# Patient Record
Sex: MALE | Race: WHITE
[De-identification: names, ages, dates, MRNs, and addresses within clinical notes are randomized per-mention and may not be internally consistent; named-entity substitution may affect disease eponyms.]

---

## 2018-01-02 ENCOUNTER — MEDICATION RENEWAL (OUTPATIENT)
Age: 83
End: 2018-01-02

## 2018-01-02 PROBLEM — Z00.00 ENCOUNTER FOR PREVENTIVE HEALTH EXAMINATION: Status: ACTIVE | Noted: 2018-01-02

## 2018-04-30 ENCOUNTER — APPOINTMENT (OUTPATIENT)
Dept: ENDOCRINOLOGY | Facility: CLINIC | Age: 83
End: 2018-04-30
Payer: MEDICARE

## 2018-04-30 VITALS
BODY MASS INDEX: 25.09 KG/M2 | HEART RATE: 61 BPM | SYSTOLIC BLOOD PRESSURE: 135 MMHG | WEIGHT: 158 LBS | TEMPERATURE: 97.1 F | HEIGHT: 66.5 IN | DIASTOLIC BLOOD PRESSURE: 74 MMHG | OXYGEN SATURATION: 96 %

## 2018-04-30 DIAGNOSIS — Z80.6 FAMILY HISTORY OF LEUKEMIA: ICD-10-CM

## 2018-04-30 DIAGNOSIS — Z80.0 FAMILY HISTORY OF MALIGNANT NEOPLASM OF DIGESTIVE ORGANS: ICD-10-CM

## 2018-04-30 DIAGNOSIS — I21.9 ACUTE MYOCARDIAL INFARCTION, UNSPECIFIED: ICD-10-CM

## 2018-04-30 DIAGNOSIS — N40.0 BENIGN PROSTATIC HYPERPLASIA WITHOUT LOWER URINARY TRACT SYMPMS: ICD-10-CM

## 2018-04-30 DIAGNOSIS — Z87.891 PERSONAL HISTORY OF NICOTINE DEPENDENCE: ICD-10-CM

## 2018-04-30 DIAGNOSIS — C91.90 LYMPHOID LEUKEMIA, UNSPECIFIED NOT HAVING ACHIEVED REMISSION: ICD-10-CM

## 2018-04-30 DIAGNOSIS — E55.9 VITAMIN D DEFICIENCY, UNSPECIFIED: ICD-10-CM

## 2018-04-30 DIAGNOSIS — I25.10 ATHEROSCLEROTIC HEART DISEASE OF NATIVE CORONARY ARTERY W/OUT ANGINA PECTORIS: ICD-10-CM

## 2018-04-30 PROCEDURE — 99214 OFFICE O/P EST MOD 30 MIN: CPT

## 2018-04-30 RX ORDER — DUTASTERIDE 0.5 MG/1
0.5 CAPSULE, LIQUID FILLED ORAL
Refills: 0 | Status: ACTIVE | COMMUNITY
Start: 2018-04-30

## 2018-04-30 RX ORDER — TAMSULOSIN HYDROCHLORIDE 0.4 MG/1
0.4 CAPSULE ORAL
Refills: 0 | Status: ACTIVE | COMMUNITY
Start: 2018-04-30

## 2018-04-30 RX ORDER — CHROMIUM 200 MCG
1000 TABLET ORAL DAILY
Refills: 0 | Status: ACTIVE | COMMUNITY
Start: 2018-04-30

## 2018-05-03 ENCOUNTER — RX RENEWAL (OUTPATIENT)
Age: 83
End: 2018-05-03

## 2018-05-03 RX ORDER — LEVOTHYROXINE SODIUM 0.09 MG/1
88 TABLET ORAL
Qty: 30 | Refills: 11 | Status: DISCONTINUED | COMMUNITY
Start: 2018-01-02 | End: 2018-05-03

## 2018-12-03 ENCOUNTER — APPOINTMENT (OUTPATIENT)
Dept: ENDOCRINOLOGY | Facility: CLINIC | Age: 83
End: 2018-12-03

## 2019-01-08 ENCOUNTER — TRANSCRIPTION ENCOUNTER (OUTPATIENT)
Age: 84
End: 2019-01-08

## 2019-01-30 ENCOUNTER — APPOINTMENT (OUTPATIENT)
Dept: ENDOCRINOLOGY | Facility: CLINIC | Age: 84
End: 2019-01-30

## 2019-02-15 ENCOUNTER — MEDICATION RENEWAL (OUTPATIENT)
Age: 84
End: 2019-02-15

## 2019-04-16 ENCOUNTER — MEDICATION RENEWAL (OUTPATIENT)
Age: 84
End: 2019-04-16

## 2019-05-30 ENCOUNTER — RX RENEWAL (OUTPATIENT)
Age: 84
End: 2019-05-30

## 2019-06-26 ENCOUNTER — MEDICATION RENEWAL (OUTPATIENT)
Age: 84
End: 2019-06-26

## 2019-07-17 ENCOUNTER — APPOINTMENT (OUTPATIENT)
Dept: ENDOCRINOLOGY | Facility: CLINIC | Age: 84
End: 2019-07-17
Payer: MEDICARE

## 2019-07-17 VITALS
BODY MASS INDEX: 24.78 KG/M2 | HEIGHT: 66.5 IN | DIASTOLIC BLOOD PRESSURE: 57 MMHG | HEART RATE: 49 BPM | WEIGHT: 156 LBS | OXYGEN SATURATION: 98 % | SYSTOLIC BLOOD PRESSURE: 155 MMHG

## 2019-07-17 DIAGNOSIS — R73.01 IMPAIRED FASTING GLUCOSE: ICD-10-CM

## 2019-07-17 DIAGNOSIS — M17.0 BILATERAL PRIMARY OSTEOARTHRITIS OF KNEE: ICD-10-CM

## 2019-07-17 DIAGNOSIS — H91.93 UNSPECIFIED HEARING LOSS, BILATERAL: ICD-10-CM

## 2019-07-17 PROCEDURE — 99214 OFFICE O/P EST MOD 30 MIN: CPT

## 2019-07-21 PROBLEM — M17.0 OSTEOARTHRITIS OF BOTH KNEES, UNSPECIFIED OSTEOARTHRITIS TYPE: Status: ACTIVE | Noted: 2019-07-21

## 2019-07-21 PROBLEM — H91.93 HEARING DEFICIT, BILATERAL: Status: ACTIVE | Noted: 2019-07-21

## 2019-07-21 PROBLEM — R73.01 IMPAIRED FASTING GLUCOSE: Status: ACTIVE | Noted: 2019-07-21

## 2019-07-21 RX ORDER — LEVOTHYROXINE SODIUM 0.1 MG/1
100 TABLET ORAL
Qty: 90 | Refills: 0 | Status: DISCONTINUED | COMMUNITY
Start: 2018-05-03 | End: 2019-07-21

## 2019-07-21 NOTE — DATA REVIEWED
[FreeTextEntry1] : [employer]  (7/3/19)\par \par ,  A1c 4.9%\par CMP -- decreased globulins (1.5 gm/dl), otherwise within normal limits \par LDL 55, HDL 18, \par TSH level 5.01 uU/ml  (0.4-4.5)\par 25-D level in target range at 34 ng/ml

## 2019-07-21 NOTE — PHYSICAL EXAM
[Alert] : alert [Well Nourished] : well nourished [Healthy Appearance] : healthy appearance [Normal Sclera/Conjunctiva] : normal sclera/conjunctiva [EOMI] : extra ocular movement intact [No Proptosis] : no proptosis [No Lid Lag] : no lid lag [Normal Outer Ear/Nose] : the ears and nose were normal in appearance [No Neck Mass] : no neck mass was observed [No LAD] : no lymphadenopathy [Thyroid Not Enlarged] : the thyroid was not enlarged [Clear to Auscultation] : lungs were clear to auscultation bilaterally [Normal Rate] : heart rate was normal  [Regular Rhythm] : with a regular rhythm [Carotids Normal] : carotid pulses were normal with no bruits [No Edema] : there was no peripheral edema [Not Tender] : non-tender [Soft] : abdomen soft [No HSM] : no hepato-splenomegaly [Normal] : normal and non tender [No CVA Tenderness] : no ~M costovertebral angle tenderness [Normal Strength/Tone] : muscle strength and tone were normal [No Involuntary Movements] : no involuntary movements were seen [No Rash] : no rash [No Tremors] : no tremors [Normal Affect] : the affect was normal [Normal Mood] : the mood was normal [Gynecomastia] : no gynecomastia [Kyphosis] : no kyphosis present [Foot Ulcers] : no foot ulcers [Acanthosis Nigricans] : no acanthosis nigricans [de-identified] : Appears somewhat younger than his stated age [de-identified] : Significant hearing deficit even with both hearing aids in place [de-identified] : Pupils miotic.  No corneal arcus or xanthelasma [de-identified] : No murmurs [de-identified] : Multiple large varicosities both lower legs.  DP pulses non-palpable bilaterally but capillary refill is normal [de-identified] : No cervical or supraclavicular adenopathy [de-identified] : Valgus deformities of both ankles/feet.  Moderate swelling of both knees [de-identified] : Hip flexor strength 4/5 bilaterally, quadriceps 4+/5 bilaterally [de-identified] : Toenails mycotic.  Significant male-pattern hair loss

## 2019-07-21 NOTE — REASON FOR VISIT
[Follow-Up: _____] : a [unfilled] follow-up visit [FreeTextEntry1] : hypothyroidism, hyperlipidemia and impaired fasting glucose

## 2019-07-21 NOTE — ASSESSMENT
[FreeTextEntry1] : 1) Hypothyroidism:  TSH level is mildly elevated on the current bloodwork.  Pt appears to be compliant with his regimen, so would assume that we are seeing a further loss of endogenous thyroid reserve.\par --To increase his levothyroxine to 112 mcg/day at his next refill\par \par 2) Hyperlipidemia:  LDL-cholesterol is well below his target of 70 mg% despite the rather minimal dose of atorvastatin.\par --Continue atorvastatin 5 mg/day\par \par 3) Impaired fasting glucose:  FBS is barely into the IFG range, and his A1c level is well below the threshold for pre-diabetes.\par \par 4) Hypertension:  Pt has no previous history of hypertension, but his BP is distinctly elevated on today's exam.\par --Pt is to begin monitoring BPs at home.  Target of < 130-140/80-85 was discussed [FreeTextEntry2] : Home BP monitoring and targets

## 2019-07-21 NOTE — HISTORY OF PRESENT ILLNESS
[FreeTextEntry1] : 89-year-old man who is followed for hypothyroidism, hyperlipidemia and impaired fasting glucose.  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  His other active medical problems include CLL (stable for the past several years), BPH (s/p TURP in 2013 after he presented with gross hematuria) and osteoarthritis of both knees.\par \par He now returns after a hiatus of over a year.\par Interim history since his last visit:\par --Has had increasing difficulty with ambulation secondary to the OA in his knees.  He is technically a candidate for joint replacement, but his orthopedist is unwilling to do the surgery because of his age.  He uses a cane when he is out of the house.\par --Has continued seeing the hematologist at [facility], and his CBC has been stable.  \par --Has been more fatigued, despite getting 9-10 hours of sleep a night\par --Has had worsening vision in his R eye and has seen two different ophthalmologists, but cannot tell me what the problem is.\par Taking the levothyroxine consistently and on an empty stomach.  (Usually takes it at 5 AM when he is awakened to urinate).  Has no thyroid-related symptoms (other than the fatigue).

## 2019-07-21 NOTE — REVIEW OF SYSTEMS
[Fatigue] : fatigue [Hair Loss] : hair loss [Decreased Appetite] : appetite not decreased [Recent Weight Gain (___ Lbs)] : no recent weight gain [Recent Weight Loss (___ Lbs)] : no recent weight loss [Fever] : no fever [Dry Eyes] : no dryness of the eyes [Chills] : no chills [Eyes Itch] : no itching of the eyes [Dysphagia] : no dysphagia [Chest Pain] : no chest pain [Palpitations] : no palpitations [Lower Ext Edema] : no lower extremity edema [Shortness Of Breath] : no shortness of breath [Cough] : no cough [Wheezing] : no wheezing was heard [Nausea] : no nausea [SOB on Exertion] : no shortness of breath during exertion [Diarrhea] : no diarrhea [Constipation] : no constipation [Polyuria] : no polyuria [Heartburn] : no heartburn [Dysuria] : no dysuria [Muscle Cramps] : no muscle cramps [Dry Skin] : no dry skin [Myalgia] : no myalgia  [Tremors] : no tremors [Headache] : no headaches [Pain/Numbness of Digits] : no pain/numbness of digits [Depression] : no depression [Anxiety] : no anxiety [Stress] : no stress [Insomnia] : no insomnia [Polydipsia] : no polydipsia [Cold Intolerance] : cold tolerant [Easy Bleeding] : no ~M tendency for easy bleeding [Heat Intolerance] : heat tolerant [FreeTextEntry3] : See comments in the HPI re: decreased acuity in the R eye [Easy Bruising] : no tendency for easy bruising [FreeTextEntry9] : Pain and mild swelling in both knees [FreeTextEntry4] : Significant hearing deficit is stable [FreeTextEntry8] : Nocturia once a night, but has increased frequency during the day [de-identified] : Ambulation is impaired by his knee pain and foot deformities

## 2019-09-19 ENCOUNTER — RX RENEWAL (OUTPATIENT)
Age: 84
End: 2019-09-19

## 2020-01-21 ENCOUNTER — APPOINTMENT (OUTPATIENT)
Dept: ENDOCRINOLOGY | Facility: CLINIC | Age: 85
End: 2020-01-21

## 2020-03-31 ENCOUNTER — RX RENEWAL (OUTPATIENT)
Age: 85
End: 2020-03-31

## 2020-05-13 ENCOUNTER — APPOINTMENT (OUTPATIENT)
Dept: ENDOCRINOLOGY | Facility: CLINIC | Age: 85
End: 2020-05-13

## 2020-08-20 ENCOUNTER — APPOINTMENT (OUTPATIENT)
Dept: ENDOCRINOLOGY | Facility: CLINIC | Age: 85
End: 2020-08-20

## 2020-11-13 ENCOUNTER — APPOINTMENT (OUTPATIENT)
Dept: ENDOCRINOLOGY | Facility: CLINIC | Age: 85
End: 2020-11-13

## 2022-06-30 ENCOUNTER — APPOINTMENT (OUTPATIENT)
Dept: ENDOCRINOLOGY | Facility: CLINIC | Age: 87
End: 2022-06-30

## 2022-06-30 VITALS
DIASTOLIC BLOOD PRESSURE: 65 MMHG | HEART RATE: 60 BPM | TEMPERATURE: 97.5 F | WEIGHT: 137 LBS | BODY MASS INDEX: 21.76 KG/M2 | OXYGEN SATURATION: 95 % | SYSTOLIC BLOOD PRESSURE: 128 MMHG | HEIGHT: 66.5 IN

## 2022-06-30 DIAGNOSIS — E78.2 MIXED HYPERLIPIDEMIA: ICD-10-CM

## 2022-06-30 DIAGNOSIS — E03.9 HYPOTHYROIDISM, UNSPECIFIED: ICD-10-CM

## 2022-06-30 DIAGNOSIS — R73.03 PREDIABETES.: ICD-10-CM

## 2022-06-30 PROCEDURE — 99214 OFFICE O/P EST MOD 30 MIN: CPT

## 2022-06-30 RX ORDER — ASPIRIN ENTERIC COATED TABLETS 81 MG 81 MG/1
81 TABLET, DELAYED RELEASE ORAL DAILY
Refills: 0 | Status: DISCONTINUED | COMMUNITY
Start: 2018-04-30 | End: 2022-06-30

## 2022-07-01 PROBLEM — R73.03 PRE-DIABETES: Status: ACTIVE | Noted: 2018-04-30

## 2022-07-01 PROBLEM — E03.9 PRIMARY HYPOTHYROIDISM: Status: ACTIVE | Noted: 2018-01-02

## 2022-07-01 PROBLEM — E78.2 MIXED HYPERLIPIDEMIA: Status: ACTIVE | Noted: 2018-04-30

## 2022-07-13 ENCOUNTER — RX RENEWAL (OUTPATIENT)
Age: 87
End: 2022-07-13

## 2022-07-13 NOTE — ADDENDUM
[FreeTextEntry1] : Spoke with the pt's wife (given the pt's marked hearing deficit) on 7/13 regarding the results of the bloodwork done on the day of his visit.\par \par FBS was fine at 100 mg%, no A1c level was done\par LDL 46, HDL 33, TG 97\par but TSH elevated to 11.1 uU/ml (with free T4 1.4 ng/dl)\par Pt insists that he has been taking levothyroxine consistently, and seems to be doing it correctly (45 min before breakfast)\par Will increase the dose, but only to 125 mcg/day.\par To repeat TFTs late August at [facility]--two requisitions mailed

## 2022-07-13 NOTE — REVIEW OF SYSTEMS
[Decreased Appetite] : decreased appetite [Hair Loss] : hair loss [Difficulty Walking] : difficulty walking [Fatigue] : no fatigue [Fever] : no fever [Chills] : no chills [Dry Eyes] : no dryness [Blurred Vision] : no blurred vision [Eyes Itch] : no itch [Dysphagia] : no dysphagia [Neck Pain] : no neck pain [Chest Pain] : no chest pain [Palpitations] : no palpitations [Lower Ext Edema] : no lower extremity edema [Shortness Of Breath] : no shortness of breath [Cough] : no cough [Wheezing] : no wheezing [SOB on Exertion] : no shortness of breath on exertion [Nausea] : no nausea [Constipation] : no constipation [Heartburn] : no heartburn [Diarrhea] : no diarrhea [Polyuria] : no polyuria [Dysuria] : no dysuria [Myalgia] : no myalgia  [Dry Skin] : no dry skin [Ulcer] : no ulcer [Headaches] : no headaches [Tremors] : no tremors [Pain/Numbness of Digits] : no pain/numbness of digits [Depression] : no depression [Anxiety] : no anxiety [Stress] : no stress [Polydipsia] : no polydipsia [Cold Intolerance] : no cold intolerance [Heat Intolerance] : no heat intolerance [Easy Bleeding] : no ~M tendency for easy bleeding [Easy Bruising] : no tendency for easy bruising [FreeTextEntry2] : Has lost 19 lb since his last visit in 2019 [FreeTextEntry3] : Decreased vision in his R eye [FreeTextEntry4] : Long-standing severe hearing deficit [FreeTextEntry8] : Nocturia once a night.  Denies hematuria.   [FreeTextEntry9] : Severe pain in both knees.  Mild weakness of both legs [de-identified] : Uses a cane for ambulation

## 2022-07-13 NOTE — HISTORY OF PRESENT ILLNESS
[FreeTextEntry1] : 89-year-old man who is followed for hypothyroidism, hyperlipidemia and pre-diabetes.  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  His other active medical problems include CLL (stable for the past several years), BPH (s/p TURP in 2013 after he presented with gross hematuria) and severe osteoarthritis of both knees.\par \par He now returns after a hiatus of nearly 3 years--(mostly because of COVID)\moreno Says that he feels quite well.  \moreno Has been seeing Dr. Sharp every 6 months for follow-up of his CLL.  Is on medication but does not know the name.  (Has not received any systemic chemo)\moreno Has lost weight, attributes this to eating only 2 meals a day\par --Breakfast is usually cold cereal\par --Supper is mostly takeout--Burger twice a week, Mexican food twice a week, pizza once a week, otherwise salad.\par --Snacks on fruit at night\par --Intake of baked goods is minimal\par --Will sometimes use yogurt as a snack.\moreno Did not do any bloodwork before the visit.

## 2022-07-13 NOTE — ASSESSMENT
[FreeTextEntry1] : 1) Hypothyroidism:  Pt says that he has been consistent in taking the levothyroxine, but has not had TFTs done within the past few years.  Will check these today\par --Pending results, pt is to continue levothyroxine 112 mcg/day\par \par 2) Hyperlipidemia:  LDL-cholesterol levels are usually well below his target of 70 mg% despite the extremely low (5 mg) dose of atorvastatin.\par --Continue atorvastatin 5 mg/day pending results\par --Lipid panel today\par \par 3) Pre-diabetes:  Was only borderline in the past, and FBS/A1c may well be below the pre-diabetes range given his weight loss.\par --Repeat FBS and A1c level today\par \par Will try to reach Dr. Sharp to find out what medication pt is taking for the CLL\par Will discuss the results of today's labs with the pt next week.\par See for follow-up in 6 months.  CMP, lipids, A1c, TFTs before the visit [FreeTextEntry2] : Medication compliance, diet

## 2022-07-13 NOTE — PHYSICAL EXAM
[Alert] : alert [Well Nourished] : well nourished [Healthy Appearance] : healthy appearance [No Acute Distress] : no acute distress [Normal Sclera/Conjunctiva] : normal sclera/conjunctiva [EOMI] : extra ocular movement intact [PERRL] : pupils equal, round and reactive to light [No Proptosis] : no proptosis [Normal Outer Ear/Nose] : the ears and nose were normal in appearance [No Neck Mass] : no neck mass was observed [No LAD] : no lymphadenopathy [Thyroid Not Enlarged] : the thyroid was not enlarged [No Thyroid Nodules] : no palpable thyroid nodules [Clear to Auscultation] : lungs were clear to auscultation bilaterally [Normal Rate] : heart rate was normal [No Murmurs] : no murmurs [Regular Rhythm] : with a regular rhythm [Carotids Normal] : carotid pulses were normal with no bruits [No Edema] : no peripheral edema [Not Tender] : non-tender [Soft] : abdomen soft [No HSM] : no hepato-splenomegaly [Normal Supraclavicular Nodes] : no supraclavicular lymphadenopathy [Normal Anterior Cervical Nodes] : no anterior cervical lymphadenopathy [No CVA Tenderness] : no ~M costovertebral angle tenderness [No Involuntary Movements] : no involuntary movements were seen [No Tremors] : no tremors [Normal Sensation on Monofilament Testing] : normal sensation on monofilament testing of lower extremities [Normal Affect] : the affect was normal [Normal Mood] : the mood was normal [Kyphosis] : no kyphosis present [Acanthosis Nigricans] : no acanthosis nigricans [Foot Ulcers] : no foot ulcers [de-identified] : Significant ptosis R eye [de-identified] : Hearing is significantly impaired even with hearing aids [de-identified] : DP pulses non-palpable bilaterally but capillary refill is normal [de-identified] : Swelling and valgus deformities of both knees.  Mild LE weakness [de-identified] : Vibratory sensation moderately decreased over the toes

## 2022-07-14 LAB
ALBUMIN SERPL ELPH-MCNC: 5.3 G/DL
ALP BLD-CCNC: 82 U/L
ALT SERPL-CCNC: 17 U/L
ANION GAP SERPL CALC-SCNC: 13 MMOL/L
AST SERPL-CCNC: 22 U/L
BILIRUB SERPL-MCNC: 1.6 MG/DL
BUN SERPL-MCNC: 19 MG/DL
CALCIUM SERPL-MCNC: 9.8 MG/DL
CHLORIDE SERPL-SCNC: 100 MMOL/L
CHOLEST SERPL-MCNC: 98 MG/DL
CO2 SERPL-SCNC: 26 MMOL/L
CREAT SERPL-MCNC: 1.03 MG/DL
EGFR: 67 ML/MIN/1.73M2
GLUCOSE SERPL-MCNC: 100 MG/DL
HDLC SERPL-MCNC: 33 MG/DL
LDLC SERPL CALC-MCNC: 46 MG/DL
NONHDLC SERPL-MCNC: 65 MG/DL
POTASSIUM SERPL-SCNC: 4.1 MMOL/L
PROT SERPL-MCNC: 6.8 G/DL
SODIUM SERPL-SCNC: 139 MMOL/L
T4 FREE SERPL-MCNC: 1.4 NG/DL
TRIGL SERPL-MCNC: 97 MG/DL
TSH SERPL-ACNC: 11.1 UIU/ML

## 2022-07-14 RX ORDER — LEVOTHYROXINE SODIUM 0.12 MG/1
125 TABLET ORAL DAILY
Qty: 30 | Refills: 11 | Status: ACTIVE | COMMUNITY
Start: 2022-07-14 | End: 1900-01-01

## 2022-07-14 RX ORDER — ATORVASTATIN CALCIUM 10 MG/1
10 TABLET, FILM COATED ORAL DAILY
Qty: 15 | Refills: 5 | Status: ACTIVE | COMMUNITY
Start: 2018-04-30 | End: 1900-01-01

## 2022-07-14 RX ORDER — LEVOTHYROXINE SODIUM 0.11 MG/1
112 TABLET ORAL
Qty: 30 | Refills: 0 | Status: DISCONTINUED | COMMUNITY
Start: 2019-07-21 | End: 2022-07-14

## 2022-07-27 ENCOUNTER — APPOINTMENT (OUTPATIENT)
Dept: ENDOCRINOLOGY | Facility: CLINIC | Age: 87
End: 2022-07-27

## 2022-08-03 ENCOUNTER — APPOINTMENT (OUTPATIENT)
Dept: ENDOCRINOLOGY | Facility: CLINIC | Age: 87
End: 2022-08-03